# Patient Record
Sex: FEMALE | Race: WHITE | NOT HISPANIC OR LATINO
[De-identification: names, ages, dates, MRNs, and addresses within clinical notes are randomized per-mention and may not be internally consistent; named-entity substitution may affect disease eponyms.]

---

## 2019-03-20 PROBLEM — Z00.00 ENCOUNTER FOR PREVENTIVE HEALTH EXAMINATION: Status: ACTIVE | Noted: 2019-03-20

## 2019-04-02 ENCOUNTER — TRANSCRIPTION ENCOUNTER (OUTPATIENT)
Age: 24
End: 2019-04-02

## 2019-04-02 ENCOUNTER — APPOINTMENT (OUTPATIENT)
Dept: PLASTIC SURGERY | Facility: CLINIC | Age: 24
End: 2019-04-02

## 2019-04-02 ENCOUNTER — OUTPATIENT (OUTPATIENT)
Dept: OUTPATIENT SERVICES | Facility: HOSPITAL | Age: 24
LOS: 1 days | End: 2019-04-02

## 2019-04-02 VITALS
BODY MASS INDEX: 26 KG/M2 | TEMPERATURE: 99 F | WEIGHT: 129 LBS | HEART RATE: 82 BPM | HEIGHT: 59 IN | RESPIRATION RATE: 14 BRPM | DIASTOLIC BLOOD PRESSURE: 85 MMHG | SYSTOLIC BLOOD PRESSURE: 120 MMHG

## 2019-04-02 DIAGNOSIS — Z78.9 OTHER SPECIFIED HEALTH STATUS: ICD-10-CM

## 2019-04-02 RX ORDER — NORETHINDRONE 0.35 MG/1
0.35 TABLET ORAL
Refills: 0 | Status: ACTIVE | COMMUNITY

## 2019-04-02 NOTE — PHYSICAL EXAM
[NI] : Normal [Bra Size: _______] : Bra Size: [unfilled] [de-identified] : B/l macromastia, breast asymmetry, R breast significantly larger than L breast, grade 2 ptosis b/l. \par No breasts masses appreciated, NAC sensation intact, no nipple discharge. + large superficial skin veins\par SN: N: 31, NAC 10, N:IMF 19, BW R 17, L 16

## 2019-04-02 NOTE — ASSESSMENT
[FreeTextEntry1] : 22 yo  macromastia presents for breast reduction consultation\par \par - She is a good candidate for wise pattern superomedial breast reduction\par - Pt would like to be as small as possible, I discussed with her that it is difficult to predict cup size post-op but C-cup is reasonable\par - Risks, benefits, alternatives discussed with patient in detail and she would like to proceed. Possible complications include but are not limited to infection, bleeding, wound healing issues, seroma, changes in NAC sensation, loss of NAC, fat necrosis, asymmetry, need for future revision surgery.\par - Pt would like to proceed with surgery end of May

## 2019-04-02 NOTE — HISTORY OF PRESENT ILLNESS
[FreeTextEntry1] : 22 yo  presents for breast reduction consultation.\par \par Pt c/o large heavy pendulous breasts since teenage years, no recent change in size. Currently wears G/H cup bra. C/o shoulder grooving, neck and back pain and occasional inframammary intertrigo. Also has difficulty with exercising due to heavy breasts.\par \par Denies any breast masses, nipple changes or discharge, no previous breast imaging/biopsies or abnormal breast exams. NAC sensation intact b/l. \par \par No personal or family hx of autoimmune d/o, clotting d/o, DVT/PE, unfavorable scarring.\par \par PMHx: none\par PSH: none\par NKDA\par Meds: advil, OCPs\par Soc Hx: non smoker, no etoh\par Fam Hx: + breast CA in grandmother

## 2019-04-03 DIAGNOSIS — Z01.89 ENCOUNTER FOR OTHER SPECIFIED SPECIAL EXAMINATIONS: ICD-10-CM

## 2019-04-22 ENCOUNTER — APPOINTMENT (OUTPATIENT)
Dept: PLASTIC SURGERY | Facility: CLINIC | Age: 24
End: 2019-04-22

## 2019-04-22 ENCOUNTER — OUTPATIENT (OUTPATIENT)
Dept: OUTPATIENT SERVICES | Facility: HOSPITAL | Age: 24
LOS: 1 days | End: 2019-04-22

## 2019-04-24 DIAGNOSIS — Z09 ENCOUNTER FOR FOLLOW-UP EXAMINATION AFTER COMPLETED TREATMENT FOR CONDITIONS OTHER THAN MALIGNANT NEOPLASM: ICD-10-CM

## 2019-05-20 RX ORDER — DIAZEPAM 5 MG
1 TABLET ORAL
Qty: 10 | Refills: 0
Start: 2019-05-20

## 2019-05-20 RX ORDER — CHLORHEXIDINE GLUCONATE 213 G/1000ML
1 SOLUTION TOPICAL
Qty: 1 | Refills: 0
Start: 2019-05-20 | End: 2019-05-20

## 2019-05-21 ENCOUNTER — RESULT REVIEW (OUTPATIENT)
Age: 24
End: 2019-05-21

## 2019-05-21 ENCOUNTER — OUTPATIENT (OUTPATIENT)
Dept: OUTPATIENT SERVICES | Facility: HOSPITAL | Age: 24
LOS: 1 days | Discharge: HOME | End: 2019-05-21

## 2019-05-28 ENCOUNTER — OUTPATIENT (OUTPATIENT)
Dept: OUTPATIENT SERVICES | Facility: HOSPITAL | Age: 24
LOS: 1 days | End: 2019-05-28

## 2019-05-28 ENCOUNTER — APPOINTMENT (OUTPATIENT)
Dept: PLASTIC SURGERY | Facility: CLINIC | Age: 24
End: 2019-05-28

## 2019-05-28 VITALS — TEMPERATURE: 98.7 F | HEART RATE: 71 BPM | DIASTOLIC BLOOD PRESSURE: 74 MMHG | SYSTOLIC BLOOD PRESSURE: 108 MMHG

## 2019-05-28 NOTE — ASSESSMENT
[FreeTextEntry1] : 22 yo F s/p b/l breast reduction on 5/21/19 here for 1 week f/u\par \par - doing well\par - pathology reviewed with patient: benign findings\par - cont surgical bra\par - avoid exercising\par - RTC in 3 weeks

## 2019-05-28 NOTE — HISTORY OF PRESENT ILLNESS
[FreeTextEntry1] : 22 yo F s/p b/l breast reduction on 5/21/19 here for 1 week f/u.\par \par Pt has been doing well and is overall happy with the results. \par No longer taking narcotic pain meds. Continues to wears surgical bra. Nipple sensation decreased but present. \par \par Denies f/c, erythema or drainage from the incisions.

## 2019-05-28 NOTE — PHYSICAL EXAM
[NI] : Normal [de-identified] : Good overall contour and symmetry, b/l NAC viable with nipple sensation intact but decreased, edema/ecchymosis as expected, no sings of collection or infection, prineo tape in place

## 2019-05-29 DIAGNOSIS — Z09 ENCOUNTER FOR FOLLOW-UP EXAMINATION AFTER COMPLETED TREATMENT FOR CONDITIONS OTHER THAN MALIGNANT NEOPLASM: ICD-10-CM

## 2019-06-14 ENCOUNTER — APPOINTMENT (OUTPATIENT)
Dept: PLASTIC SURGERY | Facility: CLINIC | Age: 24
End: 2019-06-14

## 2019-06-14 ENCOUNTER — OUTPATIENT (OUTPATIENT)
Dept: OUTPATIENT SERVICES | Facility: HOSPITAL | Age: 24
LOS: 1 days | End: 2019-06-14

## 2019-06-14 NOTE — PHYSICAL EXAM
[NI] : Normal [de-identified] : Good overall contour and symmetry, b/l NAC viable with nipple sensation intact, edema/ecchymosis resolved, small 3mm wound dehiscence at R inferior areola w/o drainage, no sings of collection or infection, prineo tape removed

## 2019-06-14 NOTE — ASSESSMENT
[FreeTextEntry1] : 24 yo F s/p b/l breast reduction on 5/21/19 here for 3 week f/u\par \par - doing well\par - scar therapy: moisturizer at night and silicone tape during thed ay\par - daily bactiracin/dry gauze to small open wound R inferior areola\par - cont surgical bra\par - avoid exercising\par - RTC in 2 weeks

## 2019-06-14 NOTE — HISTORY OF PRESENT ILLNESS
[FreeTextEntry1] : 22 yo F s/p b/l breast reduction on 5/21/19 here for 3 week f/u.\par \par Pt has been doing well and is overall happy with the results. \par No longer taking narcotic pain meds. \par Continues to wears surgical bra. \par Nipple sensation present b/l, L nipple more sensitive than right\par \par Denies f/c, erythema or drainage from the incisions.

## 2019-06-24 ENCOUNTER — OUTPATIENT (OUTPATIENT)
Dept: OUTPATIENT SERVICES | Facility: HOSPITAL | Age: 24
LOS: 1 days | End: 2019-06-24

## 2019-06-24 ENCOUNTER — APPOINTMENT (OUTPATIENT)
Dept: PLASTIC SURGERY | Facility: CLINIC | Age: 24
End: 2019-06-24

## 2019-06-24 NOTE — PHYSICAL EXAM
[NI] : Normal [de-identified] : Good overall contour and symmetry, b/l NAC viable with nipple sensation intact,  small 3mm wound dehiscence at R inferior areola very shallow and nearly healed, incisions healing well, no sings of collection or infection

## 2019-06-24 NOTE — ASSESSMENT
[FreeTextEntry1] : 24 yo F s/p b/l breast reduction on 5/21/19 here for 5 week f/u\par \par - doing well\par - cont scar therapy: moisturizer at night and silicone tape during thed ay\par - cont surgical bra\par - avoid exercising until 6 weeks post-op\par - RTC in 4-6 weeks

## 2019-06-24 NOTE — HISTORY OF PRESENT ILLNESS
[FreeTextEntry1] : 24 yo F s/p b/l breast reduction on 5/21/19 here for 5 week f/u.\par \par Pt has been doing well and is overall happy with the results. \par No longer taking narcotic pain meds. \par Continues to wears surgical bra and applying silicone scar tape daily.\par Nipple sensation present b/l.\par \par Denies f/c, erythema or drainage from the incisions.

## 2019-06-25 DIAGNOSIS — Z09 ENCOUNTER FOR FOLLOW-UP EXAMINATION AFTER COMPLETED TREATMENT FOR CONDITIONS OTHER THAN MALIGNANT NEOPLASM: ICD-10-CM
